# Patient Record
Sex: FEMALE | Race: BLACK OR AFRICAN AMERICAN | Employment: FULL TIME | URBAN - METROPOLITAN AREA
[De-identification: names, ages, dates, MRNs, and addresses within clinical notes are randomized per-mention and may not be internally consistent; named-entity substitution may affect disease eponyms.]

---

## 2017-03-03 ENCOUNTER — HOSPITAL ENCOUNTER (OUTPATIENT)
Dept: MAMMOGRAPHY | Age: 42
Discharge: HOME | End: 2017-03-03
Attending: OBSTETRICS & GYNECOLOGY
Payer: COMMERCIAL

## 2017-03-03 DIAGNOSIS — Z12.31 VISIT FOR SCREENING MAMMOGRAM: ICD-10-CM

## 2017-03-03 PROCEDURE — 77067 SCR MAMMO BI INCL CAD: CPT

## 2017-04-24 ENCOUNTER — HOSPITAL ENCOUNTER (OUTPATIENT)
Dept: MRI IMAGING | Age: 42
Discharge: HOME | End: 2017-04-24
Attending: OBSTETRICS & GYNECOLOGY
Payer: COMMERCIAL

## 2017-04-24 DIAGNOSIS — Z80.3 FAMILY HISTORY OF BREAST CANCER: ICD-10-CM

## 2017-04-24 DIAGNOSIS — R92.2 DENSE BREASTS: ICD-10-CM

## 2017-04-24 PROCEDURE — 74011250636 HC RX REV CODE- 250/636

## 2017-04-24 PROCEDURE — 77059 MRI BREAST BI W WO CONT: CPT

## 2017-04-24 PROCEDURE — A9577 INJ MULTIHANCE: HCPCS

## 2017-04-24 RX ORDER — SODIUM CHLORIDE 0.9 % (FLUSH) 0.9 %
10 SYRINGE (ML) INJECTION
Status: ACTIVE | OUTPATIENT
Start: 2017-04-24 | End: 2017-04-24

## 2017-09-13 ENCOUNTER — ANESTHESIA EVENT (OUTPATIENT)
Dept: SURGERY | Age: 42
End: 2017-09-13
Payer: COMMERCIAL

## 2017-09-14 ENCOUNTER — HOSPITAL ENCOUNTER (OUTPATIENT)
Age: 42
Setting detail: OUTPATIENT SURGERY
Discharge: HOME | End: 2017-09-14
Attending: SURGERY | Admitting: SURGERY
Payer: COMMERCIAL

## 2017-09-14 ENCOUNTER — ANESTHESIA (OUTPATIENT)
Dept: SURGERY | Age: 42
End: 2017-09-14
Payer: COMMERCIAL

## 2017-09-14 VITALS
HEART RATE: 69 BPM | RESPIRATION RATE: 12 BRPM | TEMPERATURE: 98.2 F | BODY MASS INDEX: 42.39 KG/M2 | SYSTOLIC BLOOD PRESSURE: 130 MMHG | HEIGHT: 61 IN | OXYGEN SATURATION: 97 % | WEIGHT: 224.5 LBS | DIASTOLIC BLOOD PRESSURE: 78 MMHG

## 2017-09-14 PROCEDURE — 74011250636 HC RX REV CODE- 250/636

## 2017-09-14 PROCEDURE — 76060000032 HC ANESTHESIA 0.5 TO 1 HR: Performed by: SURGERY

## 2017-09-14 PROCEDURE — 88305 TISSUE EXAM BY PATHOLOGIST: CPT | Performed by: SURGERY

## 2017-09-14 PROCEDURE — 77030018836 HC SOL IRR NACL ICUM -A: Performed by: SURGERY

## 2017-09-14 PROCEDURE — 74011250636 HC RX REV CODE- 250/636: Performed by: SURGERY

## 2017-09-14 PROCEDURE — 77030002916 HC SUT ETHLN J&J -A: Performed by: SURGERY

## 2017-09-14 PROCEDURE — 76210000006 HC OR PH I REC 0.5 TO 1 HR: Performed by: SURGERY

## 2017-09-14 PROCEDURE — 77030032490 HC SLV COMPR SCD KNE COVD -B: Performed by: SURGERY

## 2017-09-14 PROCEDURE — 74011250636 HC RX REV CODE- 250/636: Performed by: ANESTHESIOLOGY

## 2017-09-14 PROCEDURE — 77030008477 HC STYL SATN SLP COVD -A: Performed by: ANESTHESIOLOGY

## 2017-09-14 PROCEDURE — 76210000020 HC REC RM PH II FIRST 0.5 HR: Performed by: SURGERY

## 2017-09-14 PROCEDURE — 76010000138 HC OR TIME 0.5 TO 1 HR: Performed by: SURGERY

## 2017-09-14 PROCEDURE — 74011000250 HC RX REV CODE- 250

## 2017-09-14 PROCEDURE — 77030020782 HC GWN BAIR PAWS FLX 3M -B: Performed by: ANESTHESIOLOGY

## 2017-09-14 PROCEDURE — 77030011640 HC PAD GRND REM COVD -A: Performed by: SURGERY

## 2017-09-14 PROCEDURE — 74011000250 HC RX REV CODE- 250: Performed by: SURGERY

## 2017-09-14 PROCEDURE — 74011250637 HC RX REV CODE- 250/637: Performed by: ANESTHESIOLOGY

## 2017-09-14 PROCEDURE — 77030008703 HC TU ET UNCUF COVD -A: Performed by: ANESTHESIOLOGY

## 2017-09-14 RX ORDER — DEXAMETHASONE SODIUM PHOSPHATE 4 MG/ML
INJECTION, SOLUTION INTRA-ARTICULAR; INTRALESIONAL; INTRAMUSCULAR; INTRAVENOUS; SOFT TISSUE AS NEEDED
Status: DISCONTINUED | OUTPATIENT
Start: 2017-09-14 | End: 2017-09-14 | Stop reason: HOSPADM

## 2017-09-14 RX ORDER — MIDAZOLAM HYDROCHLORIDE 1 MG/ML
2 INJECTION, SOLUTION INTRAMUSCULAR; INTRAVENOUS ONCE
Status: COMPLETED | OUTPATIENT
Start: 2017-09-14 | End: 2017-09-14

## 2017-09-14 RX ORDER — FENTANYL CITRATE 50 UG/ML
INJECTION, SOLUTION INTRAMUSCULAR; INTRAVENOUS AS NEEDED
Status: DISCONTINUED | OUTPATIENT
Start: 2017-09-14 | End: 2017-09-14 | Stop reason: HOSPADM

## 2017-09-14 RX ORDER — HYDROMORPHONE HYDROCHLORIDE 2 MG/ML
0.5 INJECTION, SOLUTION INTRAMUSCULAR; INTRAVENOUS; SUBCUTANEOUS
Status: COMPLETED | OUTPATIENT
Start: 2017-09-14 | End: 2017-09-14

## 2017-09-14 RX ORDER — CEFAZOLIN SODIUM IN 0.9 % NACL 2 G/50 ML
2 INTRAVENOUS SOLUTION, PIGGYBACK (ML) INTRAVENOUS ONCE
Status: COMPLETED | OUTPATIENT
Start: 2017-09-14 | End: 2017-09-14

## 2017-09-14 RX ORDER — SODIUM CHLORIDE, SODIUM LACTATE, POTASSIUM CHLORIDE, CALCIUM CHLORIDE 600; 310; 30; 20 MG/100ML; MG/100ML; MG/100ML; MG/100ML
75 INJECTION, SOLUTION INTRAVENOUS CONTINUOUS
Status: DISCONTINUED | OUTPATIENT
Start: 2017-09-14 | End: 2017-09-14 | Stop reason: HOSPADM

## 2017-09-14 RX ORDER — BUPIVACAINE HYDROCHLORIDE 5 MG/ML
INJECTION, SOLUTION EPIDURAL; INTRACAUDAL AS NEEDED
Status: DISCONTINUED | OUTPATIENT
Start: 2017-09-14 | End: 2017-09-14 | Stop reason: HOSPADM

## 2017-09-14 RX ORDER — NALOXONE HYDROCHLORIDE 0.4 MG/ML
0.1 INJECTION, SOLUTION INTRAMUSCULAR; INTRAVENOUS; SUBCUTANEOUS
Status: DISCONTINUED | OUTPATIENT
Start: 2017-09-14 | End: 2017-09-14 | Stop reason: HOSPADM

## 2017-09-14 RX ORDER — PROPOFOL 10 MG/ML
INJECTION, EMULSION INTRAVENOUS AS NEEDED
Status: DISCONTINUED | OUTPATIENT
Start: 2017-09-14 | End: 2017-09-14 | Stop reason: HOSPADM

## 2017-09-14 RX ORDER — OXYCODONE HYDROCHLORIDE 5 MG/1
5 TABLET ORAL
Status: DISCONTINUED | OUTPATIENT
Start: 2017-09-14 | End: 2017-09-14 | Stop reason: HOSPADM

## 2017-09-14 RX ORDER — FLUMAZENIL 0.1 MG/ML
0.2 INJECTION INTRAVENOUS AS NEEDED
Status: DISCONTINUED | OUTPATIENT
Start: 2017-09-14 | End: 2017-09-14 | Stop reason: HOSPADM

## 2017-09-14 RX ORDER — ONDANSETRON 2 MG/ML
INJECTION INTRAMUSCULAR; INTRAVENOUS AS NEEDED
Status: DISCONTINUED | OUTPATIENT
Start: 2017-09-14 | End: 2017-09-14 | Stop reason: HOSPADM

## 2017-09-14 RX ORDER — DIPHENHYDRAMINE HYDROCHLORIDE 50 MG/ML
12.5 INJECTION, SOLUTION INTRAMUSCULAR; INTRAVENOUS
Status: DISCONTINUED | OUTPATIENT
Start: 2017-09-14 | End: 2017-09-14 | Stop reason: HOSPADM

## 2017-09-14 RX ORDER — MIDAZOLAM HYDROCHLORIDE 1 MG/ML
2 INJECTION, SOLUTION INTRAMUSCULAR; INTRAVENOUS
Status: DISCONTINUED | OUTPATIENT
Start: 2017-09-14 | End: 2017-09-14 | Stop reason: HOSPADM

## 2017-09-14 RX ORDER — LIDOCAINE HYDROCHLORIDE 10 MG/ML
0.1 INJECTION INFILTRATION; PERINEURAL AS NEEDED
Status: DISCONTINUED | OUTPATIENT
Start: 2017-09-14 | End: 2017-09-14 | Stop reason: HOSPADM

## 2017-09-14 RX ORDER — LIDOCAINE HYDROCHLORIDE 20 MG/ML
INJECTION, SOLUTION EPIDURAL; INFILTRATION; INTRACAUDAL; PERINEURAL AS NEEDED
Status: DISCONTINUED | OUTPATIENT
Start: 2017-09-14 | End: 2017-09-14 | Stop reason: HOSPADM

## 2017-09-14 RX ORDER — OXYCODONE HYDROCHLORIDE 5 MG/1
10 TABLET ORAL
Status: DISCONTINUED | OUTPATIENT
Start: 2017-09-14 | End: 2017-09-14 | Stop reason: HOSPADM

## 2017-09-14 RX ORDER — SUCCINYLCHOLINE CHLORIDE 20 MG/ML
INJECTION INTRAMUSCULAR; INTRAVENOUS AS NEEDED
Status: DISCONTINUED | OUTPATIENT
Start: 2017-09-14 | End: 2017-09-14 | Stop reason: HOSPADM

## 2017-09-14 RX ORDER — FAMOTIDINE 20 MG/1
20 TABLET, FILM COATED ORAL ONCE
Status: COMPLETED | OUTPATIENT
Start: 2017-09-14 | End: 2017-09-14

## 2017-09-14 RX ORDER — ROCURONIUM BROMIDE 10 MG/ML
INJECTION, SOLUTION INTRAVENOUS AS NEEDED
Status: DISCONTINUED | OUTPATIENT
Start: 2017-09-14 | End: 2017-09-14 | Stop reason: HOSPADM

## 2017-09-14 RX ADMIN — PROPOFOL 200 MG: 10 INJECTION, EMULSION INTRAVENOUS at 07:45

## 2017-09-14 RX ADMIN — PROPOFOL 50 MG: 10 INJECTION, EMULSION INTRAVENOUS at 07:57

## 2017-09-14 RX ADMIN — SUCCINYLCHOLINE CHLORIDE 160 MG: 20 INJECTION INTRAMUSCULAR; INTRAVENOUS at 07:46

## 2017-09-14 RX ADMIN — HYDROMORPHONE HYDROCHLORIDE 0.5 MG: 2 INJECTION, SOLUTION INTRAMUSCULAR; INTRAVENOUS; SUBCUTANEOUS at 08:55

## 2017-09-14 RX ADMIN — HYDROMORPHONE HYDROCHLORIDE 0.5 MG: 2 INJECTION, SOLUTION INTRAMUSCULAR; INTRAVENOUS; SUBCUTANEOUS at 08:41

## 2017-09-14 RX ADMIN — FENTANYL CITRATE 100 MCG: 50 INJECTION, SOLUTION INTRAMUSCULAR; INTRAVENOUS at 07:44

## 2017-09-14 RX ADMIN — LIDOCAINE HYDROCHLORIDE 100 MG: 20 INJECTION, SOLUTION EPIDURAL; INFILTRATION; INTRACAUDAL; PERINEURAL at 07:44

## 2017-09-14 RX ADMIN — HYDROMORPHONE HYDROCHLORIDE 0.5 MG: 2 INJECTION, SOLUTION INTRAMUSCULAR; INTRAVENOUS; SUBCUTANEOUS at 09:05

## 2017-09-14 RX ADMIN — MIDAZOLAM HYDROCHLORIDE 2 MG: 1 INJECTION, SOLUTION INTRAMUSCULAR; INTRAVENOUS at 06:51

## 2017-09-14 RX ADMIN — FAMOTIDINE 20 MG: 20 TABLET ORAL at 06:38

## 2017-09-14 RX ADMIN — DEXAMETHASONE SODIUM PHOSPHATE 4 MG: 4 INJECTION, SOLUTION INTRA-ARTICULAR; INTRALESIONAL; INTRAMUSCULAR; INTRAVENOUS; SOFT TISSUE at 07:59

## 2017-09-14 RX ADMIN — SODIUM CHLORIDE, SODIUM LACTATE, POTASSIUM CHLORIDE, AND CALCIUM CHLORIDE 75 ML/HR: 600; 310; 30; 20 INJECTION, SOLUTION INTRAVENOUS at 06:39

## 2017-09-14 RX ADMIN — OXYCODONE HYDROCHLORIDE 10 MG: 5 TABLET ORAL at 09:14

## 2017-09-14 RX ADMIN — CEFAZOLIN 2 G: 1 INJECTION, POWDER, FOR SOLUTION INTRAMUSCULAR; INTRAVENOUS; PARENTERAL at 07:36

## 2017-09-14 RX ADMIN — ONDANSETRON 4 MG: 2 INJECTION INTRAMUSCULAR; INTRAVENOUS at 07:59

## 2017-09-14 RX ADMIN — ROCURONIUM BROMIDE 5 MG: 10 INJECTION, SOLUTION INTRAVENOUS at 07:45

## 2017-09-14 RX ADMIN — HYDROMORPHONE HYDROCHLORIDE 0.5 MG: 2 INJECTION, SOLUTION INTRAMUSCULAR; INTRAVENOUS; SUBCUTANEOUS at 08:31

## 2017-09-14 NOTE — H&P (VIEW-ONLY)
aDate: 2017      Name: Emilio Buenrostro      MRN: [id number]       : 1975       Age: 39 y.o. Sex: female        Docia Cockayne, MD       CC:    Chief Complaint   Patient presents with    New Patient     soft tissue mass       HPI:     Emilio Buenrostro is a 39 y.o. female who presents for evaluation of a soft tissue mass as a referral from Dr. Lydia Patterson. The patient has had a left forehead soft tissue mass for the past 5 years. It has increased in size. It is pruritic and uncomfortable at times. No report of trauma. No report of infection or drainage. I know the patient from a [facility] gastric bypass done in . 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    Dr Zuleyma Bill Thoracic or lumbosacral neuritis or radiculitis, unspecified 03/15/13    Tricuspid regurgitation     Vitamin B 12 deficiency     Vitamin D deficiency        PSH:    Past Surgical History:   Procedure Laterality Date    HX GASTRIC BYPASS  2010    HX HERNIA REPAIR      HX SALPINGO-OOPHORECTOMY Right     related to ectopic pregancy       MEDS:    Current Outpatient Prescriptions   Medication Sig    liraglutide (SAXENDA) 3 mg/0.5 mL (18 mg/3 mL) pen 3 mg/day  Indications: WEIGHT LOSS MANAGEMENT FOR OBESE PATIENT (BMI >= 30)    ergocalciferol (VITAMIN D2) 50,000 unit capsule Take 1 Cap by mouth every seven (7) days.  Insulin Needles, Disposable, (BD INSULIN PEN NEEDLE UF MINI) 31 gauge x 3/16\" ndle Use one daily with Saxenda pen    promethazine (PHENERGAN) 25 mg tablet Take 1 Tab by mouth two (2) times daily as needed.     CYANOCOBALAMIN, VITAMIN B-12, (VITAMIN B-12 PO) Take  by mouth.  aspirin delayed-release 81 mg tablet Take  by mouth daily.  multivitamin (ONE A DAY) tablet Take 1 Tab by mouth daily. No current facility-administered medications for this visit. ALLERGIES:      Allergies   Allergen Reactions    Apple Other (comments)     Mild tongue swelling    Grass Pollen-Meadow Fescue, Standard Hives and Itching    No Known Drug Allergies Not Reported This Time    Other [Other Medication] Other (comments)     [place], Almonds - tongue swelling, facial swelling     Pear Itching    Pineapple Other (comments)     Mild tongue swelling    Strawberry Swelling     Mild tongue swelling    Watermelon Other (comments)     Mild tongue swelling       SH:    Social History   Substance Use Topics    Smoking status: Never Smoker    Smokeless tobacco: Never Used    Alcohol use No      Comment: Rarely       FH:    Family History   Problem Relation Age of Onset    Diabetes Mother     Emphysema Mother    Erum Dora Delayed Awakening Mother     Lung Disease Mother     Hypertension Mother     Cancer Mother     Breast Cancer Mother 61    Arthritis-osteo Father     Hypertension Brother     Elevated Lipids Brother     Malignant Hyperthermia Neg Hx     Pseudocholinesterase Deficiency Neg Hx     Post-op Nausea/Vomiting Neg Hx     Emergence Delirium Neg Hx     Post-op Cognitive Dysfunction Neg Hx     Other Neg Hx        ROS: The patient has no difficulty with chest pain or shortness of breath. No fever or chills. Comprehensive 13 point review of systems was otherwise unremarkable except as noted above. Physical Exam:     Visit Vitals    /76    Pulse 78    Ht 5' 1\" (1.549 m)    Wt 226 lb 12.8 oz (102.9 kg)    BMI 42.85 kg/m2       General: Alert, oriented, cooperative black female in no acute distress. Eyes: Sclera are clear. Conjunctiva and lids within normal limits. No icterus.   Head: 4 cm x 4 cm soft tissue mass of the left forehead. No tenderness. No signs of infection. Ears and Nose: no gross deformities to visual inspection, gross hearing intact  Neck: Supple, trachea midline. No lymphadenopathy. Resp: Breathing is  non-labored. Lungs clear to auscultation without wheezing or rhonchi   CV: RRR. No murmurs, rubs or gallops appreciated. Abd: soft non-tender and non-distended without peritoneal signs. +bs    Psych:  Mood and affect appropriate. Short-term memory and understanding intact      Assessment/Plan:  Camryn Timmons is a 39 y.o. female who has signs and symptoms consistent with a soft tissue mass of the left forehead. 1.  Excision of a left forehead soft tissue mass in the operating room. 2.  I went through the risks of bleeding, infection, anesthesia, hematoma/seroma formation and possible recurrence of the lesion. I said it may be necessary to place a drain. It may be necessary to leave the wound open, if badly infected.     Romana Medley MD      [facility]   8/22/2017  11:32 AM

## 2017-09-14 NOTE — ANESTHESIA POSTPROCEDURE EVALUATION
Post-Anesthesia Evaluation and Assessment    Patient: Lashonda Bass MRN: [id number]  SSN: [id number]    YOB: 1975  Age: 39 y.o. Sex: female       Cardiovascular Function/Vital Signs  Visit Vitals    /83    Pulse 94    Temp 36.9 °C (98.5 °F)    Resp 17    Ht 5' 1\" (1.549 m)    Wt 101.8 kg (224 lb 8 oz)    SpO2 100%    BMI 42.42 kg/m2       Patient is status post general anesthesia for Procedure(s):  SOFT TISSUE MASS EXCISION OF LEFT FOREHEAD. Nausea/Vomiting: None    Postoperative hydration reviewed and adequate. Pain:  Pain Scale 1: Numeric (0 - 10) (09/14/17 0856)  Pain Intensity 1: 9 (09/14/17 0856)   Managed    Neurological Status:   Neuro (WDL): Within Defined Limits (09/14/17 0824)   At baseline    Mental Status and Level of Consciousness: Arousable    Pulmonary Status:   O2 Device: Nasal cannula (09/14/17 0824)   Adequate oxygenation and airway patent    Complications related to anesthesia: None    Post-anesthesia assessment completed.  No concerns    Signed By: Keron Ashraf MD     September 14, 2017

## 2017-09-14 NOTE — INTERVAL H&P NOTE
H&P Update:  Duane Ayoub was seen and examined. History and physical has been reviewed. The patient has been examined.  There have been no significant clinical changes since the completion of the originally dated History and Physical.    Signed By: Yordan West MD     September 14, 2017 6:45 AM

## 2017-09-14 NOTE — BRIEF OP NOTE
BRIEF OPERATIVE NOTE    Date of Procedure: 9/14/2017   Preoperative Diagnosis: LEFT FOREHEAD SOFT TISSUE MASS  Postoperative Diagnosis: SAME   Procedure(s): EXCISION OF LEFT FOREHEAD SOFT TISSUE MASS MEASURING 3 CM X 3 CM X 2 CM  Surgeon(s) and Role:     * Noemí Hough MD - Primary         Assistant Staff:       Surgical Staff:  Circ-1: Snehal Fischer RN  Scrub Tech-1: Zackery Menendez  Event Time In   Incision Start 3809   Incision Close 0802     Anesthesia: General   Estimated Blood Loss: 5 cc's  Specimens:   ID Type Source Tests Collected by Time Destination   1 : soft tissue mass left forehead Preservative Head  Noemí Hough MD 9/14/2017 0805 Pathology      Findings: See dictated note  Complications: None.   Implants: * No implants in log *

## 2017-09-14 NOTE — OP NOTES
[address]   OPERATIVE REPORT       Name:  aJvy Pereyra   MR#:  [id number]   :  1975   Account #:  [de-identified]   Date of Adm:  2017       DATE OF PROCEDURE: 2017    PREOPERATIVE DIAGNOSIS: Left forehead soft tissue mass. POSTOPERATIVE DIAGNOSIS: Left forehead soft tissue mass,   probable lipoma. PROCEDURE: Excision of a left forehead soft tissue mass   measuring 3 cm x 3 cm x 2 cm. SURGEON: Liliana Nicolas MD.    ANESTHESIA: General endotracheal anesthesia plus 10 mL of 0.5%   Marcaine plain. SPECIMENS: Left forehead soft tissue mass sent to pathology in   formalin. HISTORY: This is a 70-year-old female who was referred by Dr. Karen Rodriguez at [address] [facility] for a soft tissue mass of the   left forehead. The area had increased in size and it was   becoming unsightly, as well as uncomfortable at times. She asked   to have this area excised and I agreed to the procedure. I went   over the risks of bleeding, infection, anesthesia, potential   hematoma, seroma formation, nerve injury, potential numbness   around the site. The patient was agreeable and signed a consent   form. DESCRIPTION OF PROCEDURE: The patient was seen in the   preoperative area where a small chad with a marking pen was   placed next to this area. The patient was brought to room #3 at   [facility] operating room, placed on the operative   table in the supine position. General endotracheal anesthesia   was administered without complications. The patient's forehead   was prepped with Betadine solution. Her eye had been taped shut   and all precautions had been taken not to have any Betadine   placed around the eye. The area was draped out in the usual   manner. A time-out was done identifying the patient, surgeon,   procedure, and her birthday of 1975. Once every one in the   room agreed, we began the procedure.  I made a transverse   incision at the superior aspect of this soft tissue lesion to   hide the incision with her hairline after the wound had healed. We made the incision with a 15 scalpel blade. A soft tissue mass   measuring 3 cm x 3 cm x 2 cm was then excised with a combination   of electrocautery and blunt dissection. This lesion appeared to   be a lipoma. It was excised in its entirety and sent to   pathology for evaluation. Hemostasis was achieved with   electrocautery. The wound was irrigated. No further bleeding was   noted. I closed the skin with some interrupted sutures of 3-0   nylon. I injected 10 mL of 0.5% Marcaine around this wound site. The patient tolerated the procedure well, was extubated, brought   to recovery room in stable condition. He should be able to go   home later today. Followup with me in 1 week.         Rebecca Villarreal MD      [facility] / Sander Rutherford   D:  09/14/2017   08:15   T:  09/14/2017   10:31   Job #:  [id number]

## 2017-09-14 NOTE — ANESTHESIA PREPROCEDURE EVALUATION
Anesthetic History   No history of anesthetic complications            Review of Systems / Medical History  Patient summary reviewed and pertinent labs reviewed    Pulmonary  Within defined limits                 Neuro/Psych   Within defined limits           Cardiovascular            Dysrhythmias : PVC           GI/Hepatic/Renal  Within defined limits              Endo/Other        Morbid obesity and arthritis     Other Findings              Physical Exam    Airway  Mallampati: II  TM Distance: > 6 cm  Neck ROM: normal range of motion   Mouth opening: Normal     Cardiovascular    Rhythm: regular  Rate: normal         Dental  No notable dental hx       Pulmonary  Breath sounds clear to auscultation               Abdominal         Other Findings            Anesthetic Plan    ASA: 2  Anesthesia type: general            Anesthetic plan and risks discussed with: Patient and Family

## 2017-09-14 NOTE — PROGRESS NOTES
's Pre-op visit requested by patient. Conveyed care and concern for patient and family. Offered prayer as requested for patient, family, and staff.     Stuart Rueda MDiv, BS  Board Certified

## 2017-09-14 NOTE — IP AVS SNAPSHOT
[address] [address] 
[phone] Patient: Angelic Saleh MRN: [id number] :1975 You are allergic to the following Allergen Reactions Apple Other (comments) Mild tongue swelling Grass Pollen-Meadow Fescue, Standard Hives Itching No Known Drug Allergies Not Reported This Time Other (Other Medication) Other (comments) [address], Almonds - tongue swelling, facial swelling Pear Itching Pineapple Other (comments) Mild tongue swelling Strawberry Swelling Mild tongue swelling Watermelon Other (comments) Mild tongue swelling Recent Documentation Height Weight BMI OB Status Smoking Status 1.549 m 101.8 kg 42.42 kg/m2 Having regular periods Never Smoker Emergency Contacts Name Discharge Info Relation Home Work Mobile Isidro Arauz  Spouse [3] [phone] Berna Moffett  Parent [1] [phone] About your hospitalization You were admitted on:  2017 [facility] You were discharged on:  2017 Unit phone number:  [phone] Why you were hospitalized Your primary diagnosis was:  Not on File Providers Seen During Your Hospitalizations Provider Role Specialty Primary office phone Stefania Linares MD Attending Provider General Surgery [phone] Your Primary Care Physician (PCP) Primary Care Physician Office Phone Office Fax [address] [facility], [address] [place] [address] [phone] Follow-up Information Follow up With Details Comments Contact Info Luc Botello MD   [address] [facility] [address] 
[phone] Stefania Linares MD  Follow up with Dr. Ale Avina on  at 2:35   Street [address] [address] 
[phone] Your Appointments Thursday September 21, 2017  2:35 PM EDT Global Post Op with Andrea West MD  
[facility] ([address] [place] [address]) [address] [place] [id number] [phone] Current Discharge Medication List  
  
CONTINUE these medications which have CHANGED Dose & Instructions Dispensing Information Comments Morning Noon Evening Bedtime  
 liraglutide 3 mg/0.5 mL (18 mg/3 mL) pen Commonly known as:  [place] What changed:   
- how to take this - when to take this 
- additional instructions Your last dose was: Your next dose is:    
   
   
 3 mg/day  Indications: WEIGHT LOSS MANAGEMENT FOR OBESE PATIENT (BMI >= 30) Quantity:  1 Pen Refills:  5 CONTINUE these medications which have NOT CHANGED Dose & Instructions Dispensing Information Comments Morning Noon Evening Bedtime  
 ergocalciferol 50,000 unit capsule Commonly known as:  VITAMIN D2 Your last dose was: Your next dose is:    
   
   
 Dose:  [address] Units Take 1 Cap by mouth every seven (7) days. Quantity:  12 Cap Refills:  3 Insulin Needles (Disposable) 31 gauge x 3/16\" Ndle Commonly known as:  BD INSULIN PEN NEEDLE UF MINI Your last dose was: Your next dose is:    
   
   
 Use one daily with Saxenda pen Quantity:  30 Pen Needle Refills:  5  
     
   
   
   
  
 multivitamin tablet Commonly known as:  ONE A DAY Your last dose was: Your next dose is:    
   
   
 Dose:  1 Tab Take 1 Tab by mouth daily. Indications: hold for procedure- inistructed 9/12/17 Refills:  0  
     
   
   
   
  
 promethazine 25 mg tablet Commonly known as:  PHENERGAN Your last dose was: Your next dose is:    
   
   
 Dose:  25 mg Take 1 Tab by mouth two (2) times daily as needed. Quantity:  60 Tab Refills:  5 VITAMIN B-12 PO Your last dose was: Your next dose is:    
   
   
 Dose:  1 Tab Take 1 Tab by mouth daily. Refills:  0 Discharge Instructions Soft Tissue Mass or Cyst Excision Activity · As tolerated and as directed by your doctor. · Bathe or shower as directed by your doctor Diet · Clear liquids until no nausea or vomiting; then light diet for hte first day. · Advance to regular diet on the second day unless your doctor orders otherwise. · If nausea and vomiting continue, call your doctor. Pain · Take pain medication as directed by your doctor. · Call your doctor if your pain is NOT relieved by medication. · DO NOT take aspirin or blood thinners until directed by your doctor. Dressing care If you have any problems or concerns, call your doctor as needed or if you experience; 
· Excessive bleeding that does not stop after holding mild pressure over the area for several minutes. · Temperature of 101 degrees Fahrenheit or above. · Redness, excessive swelling or bruising, and/or green or yellow, smelly discharge from incision. After general anesthesia or intravenous sedation, for 24 hours or while taking prescription Narcotics: · Limit your activities · Do not drive and operate hazardous machinery · Do not make important personal or business decisions · Do  not drink alcoholic beverages · If you have not urinated within 8 hours after discharge, please contact your surgeon on call. *  Please give a list of your current medications to your Primary Care Provider. *  Please update this list whenever your medications are discontinued, doses are 
    changed, or new medications (including over-the-counter products) are added. *  Please carry medication information at all times in case of emergency situations. These are general instructions for a healthy lifestyle: No smoking/ No tobacco products/ Avoid exposure to second hand smoke Surgeon General's Warning:  Quitting smoking now greatly reduces serious risk to your health. Obesity, smoking, and sedentary lifestyle greatly increases your risk for illness A healthy diet, regular physical exercise & weight monitoring are important for maintaining a healthy lifestyle You may be retaining fluid if you have a history of heart failure or if you experience any of the following symptoms:  Weight gain of 3 pounds or more overnight or 5 pounds in a week, increased swelling in our hands or feet or shortness of breath while lying flat in bed. Please call your doctor as soon as you notice any of these symptoms; do not wait until your next office visit. Recognize signs and symptoms of STROKE: 
 
F-face looks uneven A-arms unable to move or move unevenly S-speech slurred or non-existent T-time-call 911 as soon as signs and symptoms begin-DO NOT go Back to bed or wait to see if you get better-TIME IS BRAIN. Discharge Orders None Introducing [facility] SERVICES! Dear Fredrick Olmos: Thank you for requesting a [employer] account. Our records indicate that you already have an active [employer] account. You can access your account anytime at [email or web address] Did you know that you can access your hospital and ER discharge instructions at any time in [employer]? You can also review all of your test results from your hospital stay or ER visit. Additional Information If you have questions, please visit the Frequently Asked Questions section of the [employer] website at [email or web address] Remember, [employer] is NOT to be used for urgent needs. For medical emergencies, dial 911. Now available from your iPhone and Android! General Information Please provide this summary of care documentation to your next provider. Patient Signature:  ____________________________________________________________ Date:  ____________________________________________________________  
  
Parveen Brake Provider Signature:  ____________________________________________________________ Date:  ____________________________________________________________

## 2017-09-14 NOTE — DISCHARGE INSTRUCTIONS
Soft Tissue Mass or Cyst Excision    Activity    · As tolerated and as directed by your doctor. · Bathe or shower as directed by your doctor    Diet    · Clear liquids until no nausea or vomiting; then light diet for hte first day. · Advance to regular diet on the second day unless your doctor orders otherwise. · If nausea and vomiting continue, call your doctor. Pain    · Take pain medication as directed by your doctor. · Call your doctor if your pain is NOT relieved by medication. · DO NOT take aspirin or blood thinners until directed by your doctor. Dressing care    If you have any problems or concerns, call your doctor as needed or if you experience;  · Excessive bleeding that does not stop after holding mild pressure over the area for several minutes. · Temperature of 101 degrees Fahrenheit or above. · Redness, excessive swelling or bruising, and/or green or yellow, smelly discharge from incision. After general anesthesia or intravenous sedation, for 24 hours or while taking prescription Narcotics:  · Limit your activities  · Do not drive and operate hazardous machinery  · Do not make important personal or business decisions  · Do  not drink alcoholic beverages  · If you have not urinated within 8 hours after discharge, please contact your surgeon on call. *  Please give a list of your current medications to your Primary Care Provider. *  Please update this list whenever your medications are discontinued, doses are      changed, or new medications (including over-the-counter products) are added. *  Please carry medication information at all times in case of emergency situations. These are general instructions for a healthy lifestyle:    No smoking/ No tobacco products/ Avoid exposure to second hand smoke    Surgeon General's Warning:  Quitting smoking now greatly reduces serious risk to your health.     Obesity, smoking, and sedentary lifestyle greatly increases your risk for illness    A healthy diet, regular physical exercise & weight monitoring are important for maintaining a healthy lifestyle    You may be retaining fluid if you have a history of heart failure or if you experience any of the following symptoms:  Weight gain of 3 pounds or more overnight or 5 pounds in a week, increased swelling in our hands or feet or shortness of breath while lying flat in bed. Please call your doctor as soon as you notice any of these symptoms; do not wait until your next office visit. Recognize signs and symptoms of STROKE:    F-face looks uneven    A-arms unable to move or move unevenly    S-speech slurred or non-existent    T-time-call 911 as soon as signs and symptoms begin-DO NOT go       Back to bed or wait to see if you get better-TIME IS BRAIN.

## 2017-09-15 ENCOUNTER — PATIENT OUTREACH (OUTPATIENT)
Dept: OTHER | Age: 42
End: 2017-09-15

## 2017-09-15 NOTE — PROGRESS NOTES
[facility] progress note    Patient eligible for [employer] care management    PMH:   Past Medical History:   Diagnosis Date    Abdominal pain, epigastric 05/10/13    Adverse effect of anesthesia     delayed awakening    Anemia, unspecified 11/10/14    Frequent PVCs     cath \"normal 2014\" and EKG \"NSR\" 2014    Hypersomnia 5/26/2015    IBS (irritable bowel syndrome)     Mitral regurgitation 07/07/14    Morbid obesity ([facility] [address] 09/12/2017    BMI 41.6    OA (osteoarthritis)     knees    Other ill-defined conditions     skin breakouts    Palpitations 07/08/14    Pulmonary insufficiency     Radial artery thrombosis, right ([facility] [address] 8/29/2014    Dr Fercho Miller Thoracic or lumbosacral neuritis or radiculitis, unspecified 03/15/13    Tricuspid regurgitation     Vitamin B 12 deficiency     Vitamin D deficiency        Social History:   Social History     Social History    Marital status:      Spouse name: N/A    Number of children: N/A    Years of education: N/A     Occupational History    Not on file. Social History Main Topics    Smoking status: Never Smoker    Smokeless tobacco: Never Used    Alcohol use No    Drug use: No    Sexual activity: Yes     Partners: Male     Birth control/ protection: Condom     Other Topics Concern    Not on file     Social History Narrative       Two patient identifiers verified. Discussed the care management program.  Patient agrees to care management services at this time. Patient has significant diagnosis of Obesity and Anemia    Care management assessment completed:    Patient stated problem: \"Weight loss\"  Patient stated Strength: \"Desire to be healthy\"   Patient stated Weakness: Busy lifestyle    Patient current understanding of Obesity. How does it impact daily life? Limits activity, hard on heart and joints    Emotional Status/Adjustment to Health State: Good    Barriers/Challenges to Care:  Busy lifestyle, not exercising at the present    Patient identified Short Term Goal  Get more active    Patient identified Long Term Goal Loose weight    Support System/Resources: CM, Family    Advance Care Planning: N/A     ADLS/DME: none    Referrals: none    Self-management of medications and adherence: Motivation for change:  Asked patient, \"on a scale of 0-10, when 0 is no motivation and 10 is the most motivation, How motivated are you to make changes to manage your weight better\"    Next scheduled follow up with Dr. Courtney Ron is 9/21. Next planned call from CM in 2 weeks. Care Plan for Chronic Disease:    1. Diagnosis 1- Obesity    2. Diagnosis 2- Anemia    3. Key pt activities to achieve better health: healthy activity/exercise and nutrition    4. Remove barriers/challenges affecting patient's health: schedule      Patient verbalized understanding of all information discussed. Pt has my contact information for any further questions, concerns, or needs.     Plan for next call:        Discuss lifestyle changes, exercise plan, good eating

## 2018-04-06 ENCOUNTER — HOSPITAL ENCOUNTER (OUTPATIENT)
Dept: ULTRASOUND IMAGING | Age: 43
Discharge: HOME | End: 2018-04-06
Attending: FAMILY MEDICINE
Payer: COMMERCIAL

## 2018-04-06 DIAGNOSIS — R10.11 RIGHT UPPER QUADRANT ABDOMINAL PAIN: ICD-10-CM

## 2018-04-06 DIAGNOSIS — R10.9 FLANK PAIN: ICD-10-CM

## 2018-04-06 PROCEDURE — 76705 ECHO EXAM OF ABDOMEN: CPT

## 2018-04-06 NOTE — PROGRESS NOTES
Pt notified of Abdominal U/S results showing gallstones without inflammation. Pt is offered surgical referral, she states that she wants to see Dr Adam Anderson, she has seen him in the past. Referral to be made.

## 2018-04-06 NOTE — PROGRESS NOTES
Gallbladder shows gallstone without inflammation. Likely reason for pain and nausea. Would benefit from surgical referral. Do you have a preference.    Nelda Montague MD

## 2018-04-16 ENCOUNTER — HOSPITAL ENCOUNTER (OUTPATIENT)
Dept: SURGERY | Age: 43
Discharge: HOME | End: 2018-04-16

## 2018-04-16 VITALS — WEIGHT: 226 LBS | HEIGHT: 61 IN | BODY MASS INDEX: 42.67 KG/M2

## 2018-04-16 NOTE — PERIOP NOTES
Patient verified name, , and surgery as listed in [facility]. Type 2 surgery, phone assessment complete. Orders not received. Labs per surgeon: no orders in EMR at this time  Labs per anesthesia protocol: CBC and CMP collected 3/30/18- results WNL per anesthesia guidelines    Patient answered medical/surgical history questions at their best of ability. All prior to admission medications documented in [facility]. Patient instructed to take the following medications the day of surgery according to anesthesia guidelines with a small sip of water: phenergan PRN, tizanidine. Hold all vitamins 7 days prior to surgery and NSAIDS 5 days prior to surgery. Medications to be held- vitamins. Patient instructed on the following:  Arrive at A Entrance, time of arrival to be called the day before by 1700  NPO after midnight including gum, mints, and ice chips  Responsible adult must drive patient to the hospital, stay during surgery, and patient will need supervision 24 hours after anesthesia  Use antibacterial soap in shower the night before surgery and on the morning of surgery  Leave all valuables (money and jewelry) at home but bring insurance card and ID on DOS  Do not wear make-up, nail polish, lotions, cologne, perfumes, powders, or oil on skin. Patient teach back successful and patient demonstrates knowledge of instruction.

## 2018-04-20 NOTE — PERIOP NOTES
Message left for [facility] @ 's office to enter preop orders for surgery 4/23/18.

## 2018-08-19 ENCOUNTER — HOSPITAL ENCOUNTER (EMERGENCY)
Age: 43
Discharge: HOME | End: 2018-08-19
Attending: EMERGENCY MEDICINE
Payer: COMMERCIAL

## 2018-08-19 ENCOUNTER — APPOINTMENT (OUTPATIENT)
Dept: ULTRASOUND IMAGING | Age: 43
End: 2018-08-19
Attending: EMERGENCY MEDICINE
Payer: COMMERCIAL

## 2018-08-19 ENCOUNTER — APPOINTMENT (OUTPATIENT)
Dept: GENERAL RADIOLOGY | Age: 43
End: 2018-08-19
Attending: EMERGENCY MEDICINE
Payer: COMMERCIAL

## 2018-08-19 VITALS
DIASTOLIC BLOOD PRESSURE: 62 MMHG | SYSTOLIC BLOOD PRESSURE: 124 MMHG | RESPIRATION RATE: 18 BRPM | OXYGEN SATURATION: 100 % | TEMPERATURE: 98 F | HEIGHT: 62 IN | WEIGHT: 220 LBS | BODY MASS INDEX: 40.48 KG/M2 | HEART RATE: 78 BPM

## 2018-08-19 DIAGNOSIS — R07.9 CHEST PAIN, UNSPECIFIED TYPE: Primary | ICD-10-CM

## 2018-08-19 DIAGNOSIS — R60.0 BILATERAL LOWER EXTREMITY EDEMA: ICD-10-CM

## 2018-08-19 LAB
ALBUMIN SERPL-MCNC: 3.4 G/DL (ref 3.5–5)
ALBUMIN/GLOB SERPL: 0.8 {RATIO} (ref 1.2–3.5)
ALP SERPL-CCNC: 92 U/L (ref 50–136)
ALT SERPL-CCNC: 17 U/L (ref 12–65)
ANION GAP SERPL CALC-SCNC: 7 MMOL/L (ref 7–16)
AST SERPL-CCNC: 14 U/L (ref 15–37)
ATRIAL RATE: 62 BPM
BASOPHILS # BLD: 0 K/UL
BASOPHILS NFR BLD: 0 % (ref 0–2)
BILIRUB SERPL-MCNC: 0.4 MG/DL (ref 0.2–1.1)
BNP SERPL-MCNC: 69 PG/ML
BUN SERPL-MCNC: 10 MG/DL (ref 6–23)
CALCIUM SERPL-MCNC: 8.3 MG/DL (ref 8.3–10.4)
CALCULATED P AXIS, ECG09: 64 DEGREES
CALCULATED R AXIS, ECG10: 60 DEGREES
CALCULATED T AXIS, ECG11: 68 DEGREES
CHLORIDE SERPL-SCNC: 106 MMOL/L (ref 98–107)
CO2 SERPL-SCNC: 27 MMOL/L (ref 21–32)
CREAT SERPL-MCNC: 0.92 MG/DL (ref 0.6–1)
D DIMER PPP FEU-MCNC: 0.44 UG/ML(FEU)
DIAGNOSIS, 93000: NORMAL
DIFFERENTIAL METHOD BLD: ABNORMAL
EOSINOPHIL # BLD: 0.1 K/UL
EOSINOPHIL NFR BLD: 1 % (ref 0.5–7.8)
ERYTHROCYTE [DISTWIDTH] IN BLOOD BY AUTOMATED COUNT: 13.5 %
GLOBULIN SER CALC-MCNC: 4.3 G/DL (ref 2.3–3.5)
GLUCOSE SERPL-MCNC: 91 MG/DL (ref 65–100)
HCT VFR BLD AUTO: 34.8 % (ref 35.8–46.3)
HGB BLD-MCNC: 11.2 G/DL (ref 11.7–15.4)
IMM GRANULOCYTES # BLD: 0 K/UL
IMM GRANULOCYTES NFR BLD AUTO: 0 % (ref 0–5)
LYMPHOCYTES # BLD: 2.8 K/UL
LYMPHOCYTES NFR BLD: 30 % (ref 13–44)
MCH RBC QN AUTO: 26.2 PG (ref 26.1–32.9)
MCHC RBC AUTO-ENTMCNC: 32.2 G/DL (ref 31.4–35)
MCV RBC AUTO: 81.5 FL (ref 79.6–97.8)
MONOCYTES # BLD: 0.6 K/UL
MONOCYTES NFR BLD: 7 % (ref 4–12)
NEUTS SEG # BLD: 5.7 K/UL
NEUTS SEG NFR BLD: 62 % (ref 43–78)
NRBC # BLD: 0 K/UL (ref 0–0.2)
P-R INTERVAL, ECG05: 140 MS
PLATELET # BLD AUTO: 300 K/UL (ref 150–450)
PMV BLD AUTO: 10.5 FL (ref 9.4–12.3)
POTASSIUM SERPL-SCNC: 3.6 MMOL/L (ref 3.5–5.1)
PROT SERPL-MCNC: 7.7 G/DL (ref 6.3–8.2)
Q-T INTERVAL, ECG07: 400 MS
QRS DURATION, ECG06: 90 MS
QTC CALCULATION (BEZET), ECG08: 406 MS
RBC # BLD AUTO: 4.27 M/UL (ref 4.05–5.2)
SODIUM SERPL-SCNC: 140 MMOL/L (ref 136–145)
TROPONIN I BLD-MCNC: 0 NG/ML (ref 0.02–0.05)
TROPONIN I BLD-MCNC: 0 NG/ML (ref 0.02–0.05)
VENTRICULAR RATE, ECG03: 62 BPM
WBC # BLD AUTO: 9.1 K/UL (ref 4.3–11.1)

## 2018-08-19 PROCEDURE — 85025 COMPLETE CBC W/AUTO DIFF WBC: CPT

## 2018-08-19 PROCEDURE — 93971 EXTREMITY STUDY: CPT

## 2018-08-19 PROCEDURE — 83880 ASSAY OF NATRIURETIC PEPTIDE: CPT

## 2018-08-19 PROCEDURE — 85379 FIBRIN DEGRADATION QUANT: CPT

## 2018-08-19 PROCEDURE — 80053 COMPREHEN METABOLIC PANEL: CPT

## 2018-08-19 PROCEDURE — 84484 ASSAY OF TROPONIN QUANT: CPT

## 2018-08-19 PROCEDURE — 99285 EMERGENCY DEPT VISIT HI MDM: CPT | Performed by: EMERGENCY MEDICINE

## 2018-08-19 PROCEDURE — 71045 X-RAY EXAM CHEST 1 VIEW: CPT

## 2018-08-19 PROCEDURE — 93005 ELECTROCARDIOGRAM TRACING: CPT | Performed by: EMERGENCY MEDICINE

## 2018-08-19 NOTE — ED NOTES
I have reviewed discharge instructions with the patient. The patient verbalized understanding. Patient left ED via Discharge Method: ambulatory to Home with family. Opportunity for questions and clarification provided. Patient given 0 scripts. To continue your aftercare when you leave the hospital, you may receive an automated call from our care team to check in on how you are doing. This is a free service and part of our promise to provide the best care and service to meet your aftercare needs.  If you have questions, or wish to unsubscribe from this service please call [phone]. Thank you for Choosing our [facility] Emergency Department.

## 2018-08-19 NOTE — ED PROVIDER NOTES
HPI     72-year-old female presenting to the emergency department complaining of bilateral lower extremity swelling, chest pain, and shortness of breath. Her symptoms of intermittent chest pain and leg swelling began 2 days ago. Today her chest pain began at 9:00 in the morning. It's located sternally and feels like an aching sensation. There are no obvious alleviating or exacerbating symptoms. She is a nonsmoker no history of diabetes or hypertension no family history of coronary disease. She's had no fever cough congestion or other viral symptoms recently. No recent pregnancies. She does note that she feels like her left leg has been more swollen than her right leg.     Past Medical History:   Diagnosis Date    Abdominal pain, epigastric 05/10/13    Adverse effect of anesthesia     delayed awakening    Anemia, unspecified 11/10/14    Frequent PVCs     cath \"normal 2014\" and EKG \"NSR\" 2014    Hypersomnia 5/26/2015    IBS (irritable bowel syndrome)     Mitral regurgitation 07/07/14    Morbid obesity ([address] 09/12/2017    BMI 41.6    OA (osteoarthritis)     knees    Other ill-defined conditions(799.89)     skin breakouts    Palpitations 07/08/14    Pulmonary insufficiency     Radial artery thrombosis, right ([address] 8/29/2014    Dr Yash Feng Thoracic or lumbosacral neuritis or radiculitis, unspecified 03/15/13    Tricuspid regurgitation     Vitamin B 12 deficiency     Vitamin D deficiency        Past Surgical History:   Procedure Laterality Date    CARDIAC SURG PROCEDURE UNLIST      clean heart cath    HX GASTRIC BYPASS  4/2010    HX HERNIA REPAIR  2014    incisional hernia    HX OTHER SURGICAL  09/14/2017    soft tissue mass removed from forehead    HX SALPINGO-OOPHORECTOMY Right     related to ectopic pregancy         Family History:   Problem Relation Age of Onset    Diabetes Mother     Emphysema Mother     Delayed Awakening Mother     Lung Disease Mother     Hypertension Mother  Cancer Mother     Breast Cancer Mother 61    Arthritis-osteo Father     Hypertension Brother     Elevated Lipids Brother        Social History     Social History    Marital status:      Spouse name: N/A    Number of children: N/A    Years of education: N/A     Occupational History    Not on file. Social History Main Topics    Smoking status: Never Smoker    Smokeless tobacco: Never Used    Alcohol use No    Drug use: No    Sexual activity: Yes     Partners: Male     Birth control/ protection: Condom     Other Topics Concern    Not on file     Social History Narrative         ALLERGIES: Apple; Grass pollen-meadow fescue, standard; No known drug allergies; Other [other medication]; Pear; Pineapple; Strawberry; and Watermelon    Review of Systems   Constitutional: Negative for fever. HENT: Negative. Eyes: Negative. Respiratory: Positive for shortness of breath. Negative for cough, chest tightness and wheezing. Cardiovascular: Positive for chest pain. Gastrointestinal: Negative for abdominal distention, abdominal pain, constipation, diarrhea and vomiting. Endocrine: Negative. Genitourinary: Negative for dysuria, flank pain, frequency and urgency. Neurological: Negative for dizziness, syncope and headaches. Psychiatric/Behavioral: Negative. All other systems reviewed and are negative. Vitals:    08/19/18 1050   BP: 136/64   Pulse: 66   Resp: 16   Temp: 98.3 °F (36.8 °C)   SpO2: 100%   Weight: 99.8 kg (220 lb)   Height: 5' 2\" (1.575 m)            Physical Exam   Constitutional: She is oriented to person, place, and time. She appears well-developed and well-nourished. No distress. HENT:   Head: Normocephalic and atraumatic. Eyes: EOM are normal. Pupils are equal, round, and reactive to light. Neck: Normal range of motion. Neck supple. Cardiovascular: Normal rate, regular rhythm and normal heart sounds. Exam reveals no gallop and no friction rub.     No murmur heard. Pulmonary/Chest: Effort normal. No stridor. No respiratory distress. She has no wheezes. She has no rales. She exhibits no tenderness. Abdominal: Soft. Bowel sounds are normal. She exhibits no distension and no mass. There is no tenderness. There is no rebound and no guarding. Musculoskeletal: Normal range of motion. She exhibits edema (nonpitting bilateral lower extremities, and no asymmetry ). She exhibits no tenderness or deformity. Neurological: She is alert and oriented to person, place, and time. Skin: Skin is warm and dry. No rash noted. She is not diaphoretic. No erythema. Psychiatric: She has a normal mood and affect. Her behavior is normal.   Vitals reviewed. MDM  Number of Diagnoses or Management Options  Bilateral lower extremity edema:   Chest pain, unspecified type:   Diagnosis management comments: Department diagnosis: ACS, PE, dissection, costochondritis, heart failure, cardiomyopathy, pneumothorax,    Patient is noted on the monitor to have frequent PVCs. However upon reviewing her medical records she has a known history of this extending back at least until 2014 and also had a negative heart catheterization which appears to have been performed in relation to this. Patient has a nonischemic EKG and negative troponin and unremarkable chest x-ray. She also has a -3 hour troponin. There is no evidence of DVT. I think that this most likely represents dependent edema, particularly given her long car ride that she recently took. I have recommended elevation and compression stockings for the edema. I have explained her that the exact cause of her chest pain is unclear and because of this she should have a low threshold to return should her symptoms worsen or change in any way. Otherwise she should follow up with her primary care doctor for further testing. She is comfortable with this plan and understands reasons to return.        Amount and/or Complexity of Data Reviewed  Clinical lab tests: ordered and reviewed  Tests in the radiology section of CPT®: ordered and reviewed    Risk of Complications, Morbidity, and/or Mortality  Presenting problems: high  Diagnostic procedures: high  Management options: high          ED Course       Procedures

## 2018-08-19 NOTE — DISCHARGE INSTRUCTIONS
Use compression stocking and elevation as we discussed for the swelling in her legs. Follow up with your primary care doctor for further evaluation of her chest pain. If her symptoms change or worsen in anyway return to the ER for further evaluation. Chest Pain: Care Instructions  Your Care Instructions    There are many things that can cause chest pain. Some are not serious and will get better on their own in a few days. But some kinds of chest pain need more testing and treatment. Your doctor may have recommended a follow-up visit in the next 8 to 12 hours. If you are not getting better, you may need more tests or treatment. Even though your doctor has released you, you still need to watch for any problems. The doctor carefully checked you, but sometimes problems can develop later. If you have new symptoms or if your symptoms do not get better, get medical care right away. If you have worse or different chest pain or pressure that lasts more than 5 minutes or you passed out (lost consciousness), call 911 or seek other emergency help right away. A medical visit is only one step in your treatment. Even if you feel better, you still need to do what your doctor recommends, such as going to all suggested follow-up appointments and taking medicines exactly as directed. This will help you recover and help prevent future problems. How can you care for yourself at home? · Rest until you feel better. · Take your medicine exactly as prescribed. Call your doctor if you think you are having a problem with your medicine. · Do not drive after taking a prescription pain medicine. When should you call for help? Call 911 if:    · You passed out (lost consciousness).     · You have severe difficulty breathing.     · You have symptoms of a heart attack. These may include:  ¨ Chest pain or pressure, or a strange feeling in your chest.  ¨ Sweating. ¨ Shortness of breath. ¨ Nausea or vomiting.   ¨ Pain, pressure, or a strange feeling in your back, neck, jaw, or upper belly or in one or both shoulders or arms. ¨ Lightheadedness or sudden weakness. ¨ A fast or irregular heartbeat. After you call 911, the  may tell you to chew 1 adult-strength or 2 to 4 low-dose aspirin. Wait for an ambulance. Do not try to drive yourself.    Call your doctor today if:    · You have any trouble breathing.     · Your chest pain gets worse.     · You are dizzy or lightheaded, or you feel like you may faint.     · You are not getting better as expected.     · You are having new or different chest pain. Where can you learn more? Go to http://get-neena.info/. Enter A120 in the search box to learn more about \"Chest Pain: Care Instructions. \"  Current as of: November 20, 2017  Content Version: 11.7  © 6188-2547 [employer]. Care instructions adapted under license by [employer] (which disclaims liability or warranty for this information). If you have questions about a medical condition or this instruction, always ask your healthcare professional. [place] [address] any warranty or liability for your use of this information.

## 2019-07-03 PROBLEM — Z01.419 ENCOUNTER FOR ANNUAL ROUTINE GYNECOLOGICAL EXAMINATION: Status: ACTIVE | Noted: 2019-07-03

## 2019-07-03 PROBLEM — Z31.69 INFERTILITY COUNSELING: Status: ACTIVE | Noted: 2019-07-03

## 2020-08-21 ENCOUNTER — EMPLOYEE WELLNESS (OUTPATIENT)
Dept: OTHER | Facility: CLINIC | Age: 45
End: 2020-08-21

## 2020-11-11 NOTE — H&P
aDate: 2020      Name: Sd Shaffer      MRN: [id number]       : 1975       Age: 40 y.o. Sex: female        Yasmany Lambert NP       CC:  No chief complaint on file. HPI:     Sd Shaffer is a 40 y.o. female who presents for evaluation of gallbladder problems. I saw the patient on 18 for the same problem writing:    CC:         Chief Complaint   Patient presents with    Follow-up       New problem/gallstones        HPI:      Sd Shaffer is a 43 y.o. female who presents for evaluation of gallbladder problems as a referral from Dr. Price Day. The patient had an ultrasound done on 18 which showed gallstones. The patient has been having RUQ pain, nausea, pain radiating to her back and bloating for the past month. The pain is worse after eating. The ultrasound done on 18 showed:Abdominal ultrasound, 2018      History: Abdominal pain.       Comparison:  Abdominal ultrasound 2013       Findings: The liver is normal in size measuring 15.3 cm. No intrahepatic  biliary ductal dilitation is seen. Liver echogenicity is normal.  The main  portal vein is patent and demonstrates hepatopedal flow.     The gallbladder contains multiple mobile, shadowing gallstones. The wall is not  thickened. The common bile duct is normal in caliber measuring 6.1 mm. Sonographic Guidry's sign was not elicited.     The pancreas is obscured by bowel gas and therefore not felt to be well  assessed. A portion of the pancreatic body is visualized and shows no gross and  normality.     The right kidney measures 11.5 cm in length. No shadowing stones, or  hydronephrosis is seen of the right kidney.     The IVC is patent. The distal abdominal aorta is normal in caliber measuring 1.7  cm. The non-visualized portions of the aorta cannot be evaluated.     IMPRESSION  IMPRESSION:   1. Cholelithiasis without strong indicators for acute cholecystitis.  If there  is continued concern for cholecystitis then further evaluation can be considered  with a nuclear medicine hepatobiliary scan.     2. No intra- or extrahepatic biliary ductal dilitation.     3. Unremarkable ultrasound appearance of the liver    I recommended a laparoscopic, possible open cholecystectomy, but she did not schedule surgery. The patient has been having epigastric pain, intermittent nausea/vomiting, right-sided back pain and bloating. The symptoms have gotten worse prompting her to return to the office today. PMH:    Past Medical History:   Diagnosis Date    Abdominal pain, epigastric 05/10/13    Abnormal Pap smear of cervix     in her 25s    Adverse effect of anesthesia     delayed awakening    Anemia, unspecified 11/10/14    Frequent PVCs     cath \"normal 2014\" and EKG \"NSR\" 2014    Hypersomnia 5/26/2015    IBS (irritable bowel syndrome)     Mitral regurgitation 07/07/2014    PT DENIES THIS HISTORY    Morbid obesity ([address] 09/12/2017    BMI 41.6    OA (osteoarthritis)     knees    Other ill-defined conditions(799.89)     skin breakouts    Palpitations 07/08/14    Pulmonary insufficiency     Radial artery thrombosis, right ([address] 8/29/2014    Dr Yves Albrecht Thoracic or lumbosacral neuritis or radiculitis, unspecified 03/15/13    Trichomonal vaginitis     Trichomonal vaginitis     Tricuspid regurgitation     PT DENIES THIS HISTORY    Vitamin B 12 deficiency     Vitamin D deficiency        PSH:    Past Surgical History:   Procedure Laterality Date    CARDIAC SURG PROCEDURE UNLIST      clean heart cath    HX GASTRIC BYPASS  4/2010    HX HERNIA REPAIR  2014    incisional hernia    HX OTHER SURGICAL  09/14/2017    soft tissue mass removed from forehead    HX RIGHT SALPINGECTOMY Right     related to ectopic pregancy       MEDS:    No current facility-administered medications for this encounter.       Current Outpatient Medications   Medication Sig    clindamycin (CLEOCIN T) 1 % lotion Apply  to affected area two (2) times a day. use thin film on affected area    ergocalciferol (VITAMIN D2) 50,000 unit capsule Take 1 Cap by mouth every seven (7) days.  promethazine (PHENERGAN) 25 mg tablet Take 1 Tab by mouth two (2) times daily as needed for Nausea.  cyanocobalamin (NASCOBAL) 500 mcg/spray spry 1 spray in one nostril, once a week       ALLERGIES:      Allergies   Allergen Reactions    Apple Other (comments)     Mild tongue swelling    Grass Pollen-Meadow Fescue, Standard Hives and Itching    No Known Drug Allergies Not Reported This Time    Other [Other Medication] Other (comments)     [place], Almonds - tongue swelling, facial swelling     Pear Itching    Pineapple Other (comments)     Mild tongue swelling    Strawberry Swelling     Mild tongue swelling    Watermelon Other (comments)     Mild tongue swelling       SH:    Social History     Tobacco Use    Smoking status: Never Smoker    Smokeless tobacco: Never Used   Substance Use Topics    Alcohol use: No     Alcohol/week: 0.0 standard drinks    Drug use: No       FH:    Family History   Problem Relation Age of Onset    Diabetes Mother     Emphysema Mother     Delayed Awakening Mother     Lung Disease Mother     Hypertension Mother     Cancer Mother     Breast Cancer Mother 61    Arthritis-osteo Father     Hypertension Brother     Elevated Lipids Brother        ROS: The patient has no difficulty with chest pain or shortness of breath. No fever or chills. Comprehensive 13 point review of systems was otherwise unremarkable except as noted above. Physical Exam:     There were no vitals taken for this visit. General: Alert, oriented, cooperative black female in no acute distress. Eyes: Sclera are clear. Conjunctiva and lids within normal limits. No icterus.   Ears and Nose: no gross deformities to visual inspection, gross hearing intact  Neck: Supple, trachea midline, no appreciable thyromegaly  Resp: Breathing is non-labored. Lungs clear to auscultation without wheezing or rhonchi   CV: RRR. No murmurs, rubs or gallops appreciated. Abd: soft, mild epigastric and RUQ tenderness, active BS'S. Psych:  Mood and affect appropriate. Short-term memory and understanding intact      Assessment/Plan:  Sd Shaffer is a 40 y.o. female who has signs and symptoms consistent with cholelithiasis/cholecystitis. 1. Laparoscopic, possible open, cholecystectomy. I went through the risks of bleeding, infection and anesthesia. I went through other risks of injury to the liver, biliary tree structures, stomach, small bowel, large bowel , pancreas and the potential need to convert to an open procedure.     Alis Calvo MD     FACS   11/11/2020  6:40 PM

## 2020-11-18 ENCOUNTER — HOSPITAL ENCOUNTER (OUTPATIENT)
Dept: LAB | Age: 45
Discharge: HOME | End: 2020-11-18
Payer: COMMERCIAL

## 2020-11-18 ENCOUNTER — ANESTHESIA EVENT (OUTPATIENT)
Dept: SURGERY | Age: 45
End: 2020-11-18
Payer: COMMERCIAL

## 2020-11-18 LAB — HGB BLD-MCNC: 11.2 G/DL (ref 11.7–15.4)

## 2020-11-18 PROCEDURE — 85018 HEMOGLOBIN: CPT

## 2020-11-18 PROCEDURE — 36415 COLL VENOUS BLD VENIPUNCTURE: CPT

## 2020-11-19 ENCOUNTER — ANESTHESIA (OUTPATIENT)
Dept: SURGERY | Age: 45
End: 2020-11-19
Payer: COMMERCIAL

## 2020-11-19 ENCOUNTER — HOSPITAL ENCOUNTER (OUTPATIENT)
Age: 45
Setting detail: OUTPATIENT SURGERY
Discharge: HOME | End: 2020-11-19
Attending: SURGERY | Admitting: SURGERY
Payer: COMMERCIAL

## 2020-11-19 VITALS
TEMPERATURE: 97.8 F | HEIGHT: 62 IN | OXYGEN SATURATION: 97 % | SYSTOLIC BLOOD PRESSURE: 125 MMHG | DIASTOLIC BLOOD PRESSURE: 67 MMHG | WEIGHT: 237 LBS | HEART RATE: 66 BPM | RESPIRATION RATE: 14 BRPM | BODY MASS INDEX: 43.61 KG/M2

## 2020-11-19 DIAGNOSIS — K80.10 CALCULUS OF GALLBLADDER WITH CHRONIC CHOLECYSTITIS WITHOUT OBSTRUCTION: ICD-10-CM

## 2020-11-19 LAB — HCG UR QL: NEGATIVE

## 2020-11-19 PROCEDURE — 77030008518 HC TBNG INSUF ENDO STRY -B: Performed by: SURGERY

## 2020-11-19 PROCEDURE — 74011250636 HC RX REV CODE- 250/636: Performed by: ANESTHESIOLOGY

## 2020-11-19 PROCEDURE — 77030009403 HC ELECTRD ENDO MEGA -B: Performed by: SURGERY

## 2020-11-19 PROCEDURE — 74011250636 HC RX REV CODE- 250/636: Performed by: STUDENT IN AN ORGANIZED HEALTH CARE EDUCATION/TRAINING PROGRAM

## 2020-11-19 PROCEDURE — 2709999900 HC NON-CHARGEABLE SUPPLY: Performed by: SURGERY

## 2020-11-19 PROCEDURE — 77030010507 HC ADH SKN DERMBND J&J -B: Performed by: SURGERY

## 2020-11-19 PROCEDURE — 77030019908 HC STETH ESOPH SIMS -A: Performed by: STUDENT IN AN ORGANIZED HEALTH CARE EDUCATION/TRAINING PROGRAM

## 2020-11-19 PROCEDURE — 76210000006 HC OR PH I REC 0.5 TO 1 HR: Performed by: SURGERY

## 2020-11-19 PROCEDURE — 74011250637 HC RX REV CODE- 250/637: Performed by: ANESTHESIOLOGY

## 2020-11-19 PROCEDURE — 77030036733 HC ENDOLP LIG VCRL SUT J&J -C: Performed by: SURGERY

## 2020-11-19 PROCEDURE — 81025 URINE PREGNANCY TEST: CPT

## 2020-11-19 PROCEDURE — 77030034154 HC SHR COAG HARM ACE J&J -F: Performed by: SURGERY

## 2020-11-19 PROCEDURE — 88304 TISSUE EXAM BY PATHOLOGIST: CPT

## 2020-11-19 PROCEDURE — 77030040922 HC BLNKT HYPOTHRM STRY -A: Performed by: STUDENT IN AN ORGANIZED HEALTH CARE EDUCATION/TRAINING PROGRAM

## 2020-11-19 PROCEDURE — 76210000021 HC REC RM PH II 0.5 TO 1 HR: Performed by: SURGERY

## 2020-11-19 PROCEDURE — 77030002933 HC SUT MCRYL J&J -A: Performed by: SURGERY

## 2020-11-19 PROCEDURE — 76060000033 HC ANESTHESIA 1 TO 1.5 HR: Performed by: SURGERY

## 2020-11-19 PROCEDURE — 77030010513 HC APPL CLP LIG J&J -C: Performed by: SURGERY

## 2020-11-19 PROCEDURE — 77030012799 HC TRCR GELPRT BLN AMR -B: Performed by: SURGERY

## 2020-11-19 PROCEDURE — 77030037088 HC TUBE ENDOTRACH ORAL NSL COVD-A: Performed by: STUDENT IN AN ORGANIZED HEALTH CARE EDUCATION/TRAINING PROGRAM

## 2020-11-19 PROCEDURE — 74011000250 HC RX REV CODE- 250: Performed by: SURGERY

## 2020-11-19 PROCEDURE — 77030039425 HC BLD LARYNG TRULITE DISP TELE -A: Performed by: STUDENT IN AN ORGANIZED HEALTH CARE EDUCATION/TRAINING PROGRAM

## 2020-11-19 PROCEDURE — 74011250636 HC RX REV CODE- 250/636: Performed by: SURGERY

## 2020-11-19 PROCEDURE — 77030008606 HC TRCR ENDOSC KII AMR -B: Performed by: SURGERY

## 2020-11-19 PROCEDURE — 76010000160 HC OR TIME 0.5 TO 1 HR INTENSV-TIER 1: Performed by: SURGERY

## 2020-11-19 PROCEDURE — 47562 LAPAROSCOPIC CHOLECYSTECTOMY: CPT | Performed by: SURGERY

## 2020-11-19 PROCEDURE — 77030040361 HC SLV COMPR DVT MDII -B: Performed by: SURGERY

## 2020-11-19 PROCEDURE — 77030020829: Performed by: SURGERY

## 2020-11-19 PROCEDURE — 77030031139 HC SUT VCRL2 J&J -A: Performed by: SURGERY

## 2020-11-19 PROCEDURE — 74011000250 HC RX REV CODE- 250: Performed by: STUDENT IN AN ORGANIZED HEALTH CARE EDUCATION/TRAINING PROGRAM

## 2020-11-19 RX ORDER — FENTANYL CITRATE 50 UG/ML
25 INJECTION, SOLUTION INTRAMUSCULAR; INTRAVENOUS ONCE
Status: DISCONTINUED | OUTPATIENT
Start: 2020-11-19 | End: 2020-11-19 | Stop reason: HOSPADM

## 2020-11-19 RX ORDER — LIDOCAINE HYDROCHLORIDE 10 MG/ML
0.1 INJECTION INFILTRATION; PERINEURAL AS NEEDED
Status: DISCONTINUED | OUTPATIENT
Start: 2020-11-19 | End: 2020-11-19 | Stop reason: HOSPADM

## 2020-11-19 RX ORDER — CEFAZOLIN SODIUM/WATER 2 G/20 ML
2 SYRINGE (ML) INTRAVENOUS
Status: COMPLETED | OUTPATIENT
Start: 2020-11-19 | End: 2020-11-19

## 2020-11-19 RX ORDER — SODIUM CHLORIDE 0.9 % (FLUSH) 0.9 %
5-40 SYRINGE (ML) INJECTION EVERY 8 HOURS
Status: DISCONTINUED | OUTPATIENT
Start: 2020-11-19 | End: 2020-11-19 | Stop reason: HOSPADM

## 2020-11-19 RX ORDER — SODIUM CHLORIDE, SODIUM LACTATE, POTASSIUM CHLORIDE, CALCIUM CHLORIDE 600; 310; 30; 20 MG/100ML; MG/100ML; MG/100ML; MG/100ML
100 INJECTION, SOLUTION INTRAVENOUS CONTINUOUS
Status: DISCONTINUED | OUTPATIENT
Start: 2020-11-19 | End: 2020-11-19 | Stop reason: HOSPADM

## 2020-11-19 RX ORDER — HYDROMORPHONE HYDROCHLORIDE 1 MG/ML
0.5 INJECTION, SOLUTION INTRAMUSCULAR; INTRAVENOUS; SUBCUTANEOUS
Status: DISCONTINUED | OUTPATIENT
Start: 2020-11-19 | End: 2020-11-19 | Stop reason: HOSPADM

## 2020-11-19 RX ORDER — DEXAMETHASONE SODIUM PHOSPHATE 4 MG/ML
INJECTION, SOLUTION INTRA-ARTICULAR; INTRALESIONAL; INTRAMUSCULAR; INTRAVENOUS; SOFT TISSUE AS NEEDED
Status: DISCONTINUED | OUTPATIENT
Start: 2020-11-19 | End: 2020-11-19 | Stop reason: HOSPADM

## 2020-11-19 RX ORDER — SODIUM CHLORIDE 9 MG/ML
50 INJECTION, SOLUTION INTRAVENOUS CONTINUOUS
Status: DISCONTINUED | OUTPATIENT
Start: 2020-11-19 | End: 2020-11-19 | Stop reason: HOSPADM

## 2020-11-19 RX ORDER — HYDROMORPHONE HYDROCHLORIDE 2 MG/ML
0.5 INJECTION, SOLUTION INTRAMUSCULAR; INTRAVENOUS; SUBCUTANEOUS
Status: DISCONTINUED | OUTPATIENT
Start: 2020-11-19 | End: 2020-11-19 | Stop reason: HOSPADM

## 2020-11-19 RX ORDER — MIDAZOLAM HYDROCHLORIDE 1 MG/ML
2 INJECTION, SOLUTION INTRAMUSCULAR; INTRAVENOUS
Status: DISCONTINUED | OUTPATIENT
Start: 2020-11-19 | End: 2020-11-19 | Stop reason: HOSPADM

## 2020-11-19 RX ORDER — OXYCODONE HYDROCHLORIDE 5 MG/1
5 TABLET ORAL
Status: DISCONTINUED | OUTPATIENT
Start: 2020-11-19 | End: 2020-11-19 | Stop reason: HOSPADM

## 2020-11-19 RX ORDER — DIPHENHYDRAMINE HYDROCHLORIDE 50 MG/ML
12.5 INJECTION, SOLUTION INTRAMUSCULAR; INTRAVENOUS
Status: COMPLETED | OUTPATIENT
Start: 2020-11-19 | End: 2020-11-19

## 2020-11-19 RX ORDER — FENTANYL CITRATE 50 UG/ML
INJECTION, SOLUTION INTRAMUSCULAR; INTRAVENOUS AS NEEDED
Status: DISCONTINUED | OUTPATIENT
Start: 2020-11-19 | End: 2020-11-19 | Stop reason: HOSPADM

## 2020-11-19 RX ORDER — OXYCODONE AND ACETAMINOPHEN 5; 325 MG/1; MG/1
1 TABLET ORAL AS NEEDED
Status: DISCONTINUED | OUTPATIENT
Start: 2020-11-19 | End: 2020-11-19 | Stop reason: HOSPADM

## 2020-11-19 RX ORDER — MIDAZOLAM HYDROCHLORIDE 1 MG/ML
INJECTION, SOLUTION INTRAMUSCULAR; INTRAVENOUS AS NEEDED
Status: DISCONTINUED | OUTPATIENT
Start: 2020-11-19 | End: 2020-11-19 | Stop reason: HOSPADM

## 2020-11-19 RX ORDER — SODIUM CHLORIDE, SODIUM LACTATE, POTASSIUM CHLORIDE, CALCIUM CHLORIDE 600; 310; 30; 20 MG/100ML; MG/100ML; MG/100ML; MG/100ML
INJECTION, SOLUTION INTRAVENOUS
Status: DISCONTINUED | OUTPATIENT
Start: 2020-11-19 | End: 2020-11-19 | Stop reason: HOSPADM

## 2020-11-19 RX ORDER — ROCURONIUM BROMIDE 10 MG/ML
INJECTION, SOLUTION INTRAVENOUS AS NEEDED
Status: DISCONTINUED | OUTPATIENT
Start: 2020-11-19 | End: 2020-11-19 | Stop reason: HOSPADM

## 2020-11-19 RX ORDER — SODIUM CHLORIDE 0.9 % (FLUSH) 0.9 %
5-40 SYRINGE (ML) INJECTION AS NEEDED
Status: DISCONTINUED | OUTPATIENT
Start: 2020-11-19 | End: 2020-11-19 | Stop reason: HOSPADM

## 2020-11-19 RX ORDER — ONDANSETRON 2 MG/ML
INJECTION INTRAMUSCULAR; INTRAVENOUS AS NEEDED
Status: DISCONTINUED | OUTPATIENT
Start: 2020-11-19 | End: 2020-11-19 | Stop reason: HOSPADM

## 2020-11-19 RX ORDER — PROPOFOL 10 MG/ML
INJECTION, EMULSION INTRAVENOUS AS NEEDED
Status: DISCONTINUED | OUTPATIENT
Start: 2020-11-19 | End: 2020-11-19 | Stop reason: HOSPADM

## 2020-11-19 RX ORDER — SODIUM CHLORIDE, SODIUM LACTATE, POTASSIUM CHLORIDE, CALCIUM CHLORIDE 600; 310; 30; 20 MG/100ML; MG/100ML; MG/100ML; MG/100ML
75 INJECTION, SOLUTION INTRAVENOUS CONTINUOUS
Status: DISCONTINUED | OUTPATIENT
Start: 2020-11-19 | End: 2020-11-19 | Stop reason: HOSPADM

## 2020-11-19 RX ORDER — LIDOCAINE HYDROCHLORIDE 20 MG/ML
INJECTION, SOLUTION EPIDURAL; INFILTRATION; INTRACAUDAL; PERINEURAL AS NEEDED
Status: DISCONTINUED | OUTPATIENT
Start: 2020-11-19 | End: 2020-11-19 | Stop reason: HOSPADM

## 2020-11-19 RX ORDER — FAMOTIDINE 20 MG/1
20 TABLET, FILM COATED ORAL ONCE
Status: COMPLETED | OUTPATIENT
Start: 2020-11-19 | End: 2020-11-19

## 2020-11-19 RX ORDER — BUPIVACAINE HYDROCHLORIDE 5 MG/ML
INJECTION, SOLUTION EPIDURAL; INTRACAUDAL AS NEEDED
Status: DISCONTINUED | OUTPATIENT
Start: 2020-11-19 | End: 2020-11-19 | Stop reason: HOSPADM

## 2020-11-19 RX ORDER — MIDAZOLAM HYDROCHLORIDE 1 MG/ML
2 INJECTION, SOLUTION INTRAMUSCULAR; INTRAVENOUS ONCE
Status: DISCONTINUED | OUTPATIENT
Start: 2020-11-19 | End: 2020-11-19 | Stop reason: HOSPADM

## 2020-11-19 RX ADMIN — HYDROMORPHONE HYDROCHLORIDE 0.5 MG: 2 INJECTION INTRAMUSCULAR; INTRAVENOUS; SUBCUTANEOUS at 11:00

## 2020-11-19 RX ADMIN — SODIUM CHLORIDE, SODIUM LACTATE, POTASSIUM CHLORIDE, AND CALCIUM CHLORIDE: 600; 310; 30; 20 INJECTION, SOLUTION INTRAVENOUS at 09:45

## 2020-11-19 RX ADMIN — FENTANYL CITRATE 50 MCG: 50 INJECTION INTRAMUSCULAR; INTRAVENOUS at 09:52

## 2020-11-19 RX ADMIN — PHENYLEPHRINE HYDROCHLORIDE 100 MCG: 10 INJECTION INTRAVENOUS at 10:11

## 2020-11-19 RX ADMIN — LIDOCAINE HYDROCHLORIDE 40 MG: 20 INJECTION, SOLUTION EPIDURAL; INFILTRATION; INTRACAUDAL; PERINEURAL at 09:52

## 2020-11-19 RX ADMIN — FENTANYL CITRATE 50 MCG: 50 INJECTION INTRAMUSCULAR; INTRAVENOUS at 10:00

## 2020-11-19 RX ADMIN — ONDANSETRON 4 MG: 2 INJECTION INTRAMUSCULAR; INTRAVENOUS at 10:25

## 2020-11-19 RX ADMIN — HYDROMORPHONE HYDROCHLORIDE 0.5 MG: 2 INJECTION INTRAMUSCULAR; INTRAVENOUS; SUBCUTANEOUS at 11:12

## 2020-11-19 RX ADMIN — FAMOTIDINE 20 MG: 20 TABLET, FILM COATED ORAL at 08:41

## 2020-11-19 RX ADMIN — HYDROMORPHONE HYDROCHLORIDE 0.5 MG: 2 INJECTION INTRAMUSCULAR; INTRAVENOUS; SUBCUTANEOUS at 10:52

## 2020-11-19 RX ADMIN — SODIUM CHLORIDE, SODIUM LACTATE, POTASSIUM CHLORIDE, AND CALCIUM CHLORIDE 1000 ML: 600; 310; 30; 20 INJECTION, SOLUTION INTRAVENOUS at 08:41

## 2020-11-19 RX ADMIN — PROPOFOL 200 MG: 10 INJECTION, EMULSION INTRAVENOUS at 09:52

## 2020-11-19 RX ADMIN — Medication 2 G: at 10:00

## 2020-11-19 RX ADMIN — MIDAZOLAM 2 MG: 1 INJECTION INTRAMUSCULAR; INTRAVENOUS at 09:43

## 2020-11-19 RX ADMIN — HYDROMORPHONE HYDROCHLORIDE 0.5 MG: 1 INJECTION, SOLUTION INTRAMUSCULAR; INTRAVENOUS; SUBCUTANEOUS at 11:38

## 2020-11-19 RX ADMIN — ROCURONIUM BROMIDE 10 MG: 10 INJECTION, SOLUTION INTRAVENOUS at 09:52

## 2020-11-19 RX ADMIN — HYDROMORPHONE HYDROCHLORIDE 0.5 MG: 2 INJECTION INTRAMUSCULAR; INTRAVENOUS; SUBCUTANEOUS at 11:06

## 2020-11-19 RX ADMIN — SUGAMMADEX 400 MG: 100 INJECTION, SOLUTION INTRAVENOUS at 10:34

## 2020-11-19 RX ADMIN — LIDOCAINE HYDROCHLORIDE 60 MG: 20 INJECTION, SOLUTION EPIDURAL; INFILTRATION; INTRACAUDAL; PERINEURAL at 09:53

## 2020-11-19 RX ADMIN — ROCURONIUM BROMIDE 40 MG: 10 INJECTION, SOLUTION INTRAVENOUS at 09:53

## 2020-11-19 RX ADMIN — DEXAMETHASONE SODIUM PHOSPHATE 4 MG: 4 INJECTION, SOLUTION INTRAMUSCULAR; INTRAVENOUS at 10:03

## 2020-11-19 RX ADMIN — PHENYLEPHRINE HYDROCHLORIDE 200 MCG: 10 INJECTION INTRAVENOUS at 10:15

## 2020-11-19 RX ADMIN — DIPHENHYDRAMINE HYDROCHLORIDE 12.5 MG: 50 INJECTION, SOLUTION INTRAMUSCULAR; INTRAVENOUS at 11:15

## 2020-11-19 NOTE — ANESTHESIA POSTPROCEDURE EVALUATION
Procedure(s):  CHOLECYSTECTOMY LAPAROSCOPIC.     general    Anesthesia Post Evaluation      Multimodal analgesia: multimodal analgesia used between 6 hours prior to anesthesia start to PACU discharge  Patient location during evaluation: bedside  Patient participation: complete - patient participated  Level of consciousness: awake  Pain management: adequate  Airway patency: patent  Anesthetic complications: no  Cardiovascular status: acceptable and stable  Respiratory status: acceptable and room air  Hydration status: acceptable  Post anesthesia nausea and vomiting:  none      INITIAL Post-op Vital signs:   Vitals Value Taken Time   /68 11/19/2020 11:38 AM   Temp 36.6 °C (97.8 °F) 11/19/2020 11:38 AM   Pulse 67 11/19/2020 11:38 AM   Resp 14 11/19/2020 11:38 AM   SpO2 99 % 11/19/2020 11:38 AM

## 2020-11-19 NOTE — DISCHARGE INSTRUCTIONS
1. Diet as tolerated except for a  low fat diet after laparoscopic cholecystectomy. 2. Showering is allowed, but no tub baths, hot tubs or swimming. 3. Drainage is common from the wounds. Change the dressings as needed. Call our office if the wounds become reddened, tender, feel warm to the touch or pus starts to drain from the wound. 4. Take prescribed pain medication as directed, usually Percocet, Norco, Ultram or Dilaudid. Take over the counter medication for minor pain. 5. Ice may be applied intermittently to the surgical site or sites. 6. Call or office, [phone], if problems arise. 7. Follow up in the office at the assigned time. 8. Resume all medications as taken per surgery, unless specifically instructed not to take certain ones. 9. No lifting more than 25 pounds until told otherwise. 10. Driving is allowed 3 days after surgery as long as you feel comfortable enough to drive and have not taken any prescription pain medication prior to driving. After general anesthesia or intravenous sedation, for 24 hours or while taking prescription Narcotics:  · Limit your activities  · A responsible adult needs to be with you for the next 24 hours  · Do not drive and operate hazardous machinery  · Do not make important personal or business decisions  · Do not drink alcoholic beverages  · If you have not urinated within 8 hours after discharge, and you are experiencing discomfort from urinary retention, please go to the nearest ED. · If you have sleep apnea and have a CPAP machine, please use it for all naps and sleeping. · Please use caution when taking narcotics and any of your home medications that may cause drowsiness. *  Please give a list of your current medications to your Primary Care Provider. *  Please update this list whenever your medications are discontinued, doses are      changed, or new medications (including over-the-counter products) are added.   *  Please carry medication information at all times in case of emergency situations. These are general instructions for a healthy lifestyle:  No smoking/ No tobacco products/ Avoid exposure to second hand smoke  Surgeon General's Warning:  Quitting smoking now greatly reduces serious risk to your health. Obesity, smoking, and sedentary lifestyle greatly increases your risk for illness  A healthy diet, regular physical exercise & weight monitoring are important for maintaining a healthy lifestyle    You may be retaining fluid if you have a history of heart failure or if you experience any of the following symptoms:  Weight gain of 3 pounds or more overnight or 5 pounds in a week, increased swelling in our hands or feet or shortness of breath while lying flat in bed. Please call your doctor as soon as you notice any of these symptoms; do not wait until your next office visit.

## 2020-11-19 NOTE — OP NOTES
Cholecystectomy Op Note Template Note     Indications: The patient was admitted to the hospital with cholelithiasis/cholecystitis. The patient now presents for a laparoscopic, possible open, cholecystectomy after discussing therapeutic alternatives. Pre-Op Diagnosis: Cholelithiasis/cholecystitis    Post-Op Diagnosis: Same    Procedure: LAPAROSCOPIC CHOLECYSTECTOMY      Surgeon: Alis Calvo MD    Anesthesia:  General plus local         Procedure Details     The patient was brought to the operating room and placed supine. After induction of a general anesthetic, the abdomen was prepped and draped in standard fashion. An incision was made in the umbilicus and a Carmen trocar was placed in the usual fashion after which  the abdomen was insufflated to 15mmHg sterile CO2. The other trocars were then placed under direct vision. These were positioned four fingerbreadths below the right costal margin in the anterior axillary line and mid clavicular line and just to the right of the falciform ligament in the epigatrium. The gallbladder was grasped. Omental adhesions were taken down. The area of Calot's triangle was dissected with the cystic duct and cystic artery being exposed. Once these two structures had been identified the gallbladder graspers were repositioned, so that one was on the liver/gallbladder junction and a second on the fundus of the gallbladder. The gallbladder was  from the liver with the use of the Harmonic scalpel. The dissection was taken down to the cystic artery which was divided between four 5 mm clips. The cystic duct was divided between four 5 mm clips. The gallbladder was free of all attachments and was removed from the abdomen via the umbilical incision site. The gallbladder was sent to pathology for evaluation at this point. The Carmen was returned to the umbilical incision site, the insufflation was restarted and the camera placed through the Carmen trocar.  The gallbladder fossa was without evidence of bleeding, there was no bleeding from the cystic artery stump and there was no evidence of bile leak from the cystic duct stump. The three upper abdominal trocars were removed under the direct vision without bleeding from the trocar sites. The Carmen trocar was removed and the fascia of the umbilicus was closed with 0'0  Vicryl. The trocar sites were closed with the subcuticular Monocryl and Steri-strips. A sterile dressing was applied. The patient was taken to the recovery room in good condition, having tolerated the procedure well. Instrument, sponge, and needle counts were correct prior to abdominal closure and at the conclusion of the case.      Findings: Gallstones    Estimated Blood Loss:     10 cc's         Specimens: Gallbladder       Signed: Tere Portillo MD

## 2020-11-19 NOTE — ANESTHESIA PREPROCEDURE EVALUATION
Relevant Problems   No relevant active problems       Anesthetic History   No history of anesthetic complications            Review of Systems / Medical History  Patient summary reviewed and pertinent labs reviewed    Pulmonary  Within defined limits                 Neuro/Psych   Within defined limits           Cardiovascular            Dysrhythmias       Exercise tolerance: >4 METS  Comments: palpatations    Stopped adipex 3 days ago   GI/Hepatic/Renal                Endo/Other        Morbid obesity     Other Findings   Comments: IBS           Physical Exam    Airway  Mallampati: II  TM Distance: 4 - 6 cm  Neck ROM: normal range of motion   Mouth opening: Normal     Cardiovascular  Regular rate and rhythm,  S1 and S2 normal,  no murmur, click, rub, or gallop  Rhythm: regular  Rate: normal         Dental  No notable dental hx       Pulmonary  Breath sounds clear to auscultation               Abdominal  Abdominal exam normal       Other Findings            Anesthetic Plan    ASA: 2  Anesthesia type: general          Induction: Intravenous  Anesthetic plan and risks discussed with: Patient

## 2020-11-19 NOTE — PERIOP NOTES
Discharge instructions reviewed with patient's  at bedside. Verbalized understanding. No questions at this time.

## 2020-11-19 NOTE — INTERVAL H&P NOTE
Update History & Physical 
 
The Patient's History and Physical of 11/11/20 was reviewed with the patient and I examined the patient. There was no change. The surgical site was confirmed by the patient and me. Plan:  The risk, benefits, expected outcome, and alternative to the recommended procedure have been discussed with the patient. Patient understands and wants to proceed with the procedure.  
 
Electronically signed by Kingsley Hudson MD on 11/19/2020 at 8:22 AM

## 2020-11-19 NOTE — PROGRESS NOTES
's pre-procedure visit requested by patient. Conveyed care and concern for patient and family. Offered prayer as requested for patient, family, and staff.     Abdirizak Hampton MDiv, BS  Board Certified

## (undated) DEVICE — GENERAL LAPAROSCOPY: Brand: MEDLINE INDUSTRIES, INC.

## (undated) DEVICE — SUTURE SZ 0 27IN 5/8 CIR UR-6  TAPER PT VIOLET ABSRB VICRYL J603H

## (undated) DEVICE — SURGICAL PROCEDURE PACK BASIC ST FRANCIS

## (undated) DEVICE — SHEARS ENDOSCP L36CM DIA5MM ULTRASONIC CRV TIP W/ ADV

## (undated) DEVICE — TOWEL,OR,DSP,ST,BLUE,DLX,1/PK,80PK/CS: Brand: MEDLINE

## (undated) DEVICE — TROCAR: Brand: KII FIOS FIRST ENTRY

## (undated) DEVICE — DRAPE,TOP,102X53,STERILE: Brand: MEDLINE

## (undated) DEVICE — APPLICATOR COT TIP 6IN WOOD --

## (undated) DEVICE — KIT,ANTI FOG,W/SPONGE & FLUID,SOFT PACK: Brand: MEDLINE

## (undated) DEVICE — COVER,LIGHT HANDLE,FLX,2/PK: Brand: MEDLINE INDUSTRIES, INC.

## (undated) DEVICE — SUT ETHLN 3-0 18IN PS2 BLK --

## (undated) DEVICE — SHEET, DRAPE, SPLIT, STERILE: Brand: MEDLINE

## (undated) DEVICE — NEEDLE HYPO 21GA L1.5IN INTRAMUSCULAR S STL LATCH BVL UP

## (undated) DEVICE — SUTURE MCRYL SZ 4-0 L18IN ABSRB UD L19MM PS-2 3/8 CIR PRIM Y496G

## (undated) DEVICE — TROCAR: Brand: KII® SLEEVE

## (undated) DEVICE — 2000CC GUARDIAN II: Brand: GUARDIAN

## (undated) DEVICE — E-Z CLEAN, PTFE COATED, ELECTROSURGICAL LAPAROSCOPIC ELECTRODE, SPATULA, 33 CM., SINGLE-USE, FOR USE WITH HAND CONTROL PENCIL: Brand: MEGADYNE

## (undated) DEVICE — PREP SKN CHLRAPRP APL 26ML STR --

## (undated) DEVICE — SOLUTION IV 1000ML 0.9% SOD CHL

## (undated) DEVICE — TRAY PREP DRY W/ PREM GLV 2 APPL 6 SPNG 2 UNDPD 1 OVERWRAP

## (undated) DEVICE — REM POLYHESIVE ADULT PATIENT RETURN ELECTRODE: Brand: VALLEYLAB

## (undated) DEVICE — BASIC SINGLE BASIN 1-LF: Brand: MEDLINE INDUSTRIES, INC.

## (undated) DEVICE — SUTURE ENDOLOOP SZ 0 L18IN ABSRB VLT LIG SLDE KNOT VCRL

## (undated) DEVICE — CONTAINER SPEC FRMLN 120ML --

## (undated) DEVICE — GARMENT,MEDLINE,DVT,INT,CALF,MED, GEN2: Brand: MEDLINE

## (undated) DEVICE — APPLIER CLP M/L SHFT DIA5MM 15 LIG LIGAMAX 5

## (undated) DEVICE — TROCARS: Brand: KII® BALLOON BLUNT TIP SYSTEM

## (undated) DEVICE — DERMABOND SKIN ADH 0.7ML -- DERMABOND ADVANCED 12/BX

## (undated) DEVICE — TUBING INSUFFLATION SMK EVAC HI FLO SET PNEUMOCLEAR

## (undated) DEVICE — BLADE SURG NO15 S STL STR DISP GLASSVAN

## (undated) DEVICE — KENDALL SCD EXPRESS SLEEVES, KNEE LENGTH, MEDIUM: Brand: KENDALL SCD